# Patient Record
Sex: MALE | Race: WHITE | URBAN - METROPOLITAN AREA
[De-identification: names, ages, dates, MRNs, and addresses within clinical notes are randomized per-mention and may not be internally consistent; named-entity substitution may affect disease eponyms.]

---

## 2020-09-10 DIAGNOSIS — Z11.59 ENCOUNTER FOR SCREENING FOR OTHER VIRAL DISEASES: Primary | ICD-10-CM

## 2020-11-19 DIAGNOSIS — Z11.59 ENCOUNTER FOR SCREENING FOR OTHER VIRAL DISEASES: ICD-10-CM

## 2020-11-19 PROCEDURE — U0003 INFECTIOUS AGENT DETECTION BY NUCLEIC ACID (DNA OR RNA); SEVERE ACUTE RESPIRATORY SYNDROME CORONAVIRUS 2 (SARS-COV-2) (CORONAVIRUS DISEASE [COVID-19]), AMPLIFIED PROBE TECHNIQUE, MAKING USE OF HIGH THROUGHPUT TECHNOLOGIES AS DESCRIBED BY CMS-2020-01-R: HCPCS | Performed by: COLON & RECTAL SURGERY

## 2020-11-20 LAB
SARS-COV-2 RNA SPEC QL NAA+PROBE: NOT DETECTED
SPECIMEN SOURCE: NORMAL

## 2020-11-23 ENCOUNTER — HOSPITAL ENCOUNTER (OUTPATIENT)
Facility: CLINIC | Age: 60
Discharge: HOME | End: 2020-11-23
Attending: COLON & RECTAL SURGERY | Admitting: COLON & RECTAL SURGERY
Payer: COMMERCIAL

## 2020-11-23 VITALS
SYSTOLIC BLOOD PRESSURE: 118 MMHG | BODY MASS INDEX: 25.74 KG/M2 | OXYGEN SATURATION: 95 % | HEIGHT: 67 IN | RESPIRATION RATE: 17 BRPM | HEART RATE: 72 BPM | WEIGHT: 164 LBS | DIASTOLIC BLOOD PRESSURE: 87 MMHG | TEMPERATURE: 98.8 F

## 2020-11-23 LAB — COLONOSCOPY: NORMAL

## 2020-11-23 PROCEDURE — 250N000011 HC RX IP 250 OP 636: Performed by: COLON & RECTAL SURGERY

## 2020-11-23 PROCEDURE — 45378 DIAGNOSTIC COLONOSCOPY: CPT | Performed by: COLON & RECTAL SURGERY

## 2020-11-23 PROCEDURE — G0121 COLON CA SCRN NOT HI RSK IND: HCPCS | Performed by: COLON & RECTAL SURGERY

## 2020-11-23 PROCEDURE — G0500 MOD SEDAT ENDO SERVICE >5YRS: HCPCS | Performed by: COLON & RECTAL SURGERY

## 2020-11-23 RX ORDER — DIPHENHYDRAMINE HCL 25 MG
25 CAPSULE ORAL EVERY 4 HOURS PRN
Status: CANCELLED | OUTPATIENT
Start: 2020-11-23

## 2020-11-23 RX ORDER — ONDANSETRON 2 MG/ML
4 INJECTION INTRAMUSCULAR; INTRAVENOUS EVERY 6 HOURS PRN
Status: CANCELLED | OUTPATIENT
Start: 2020-11-23

## 2020-11-23 RX ORDER — DIPHENHYDRAMINE HYDROCHLORIDE 50 MG/ML
25 INJECTION INTRAMUSCULAR; INTRAVENOUS EVERY 4 HOURS PRN
Status: CANCELLED | OUTPATIENT
Start: 2020-11-23

## 2020-11-23 RX ORDER — NALOXONE HYDROCHLORIDE 0.4 MG/ML
0.2 INJECTION, SOLUTION INTRAMUSCULAR; INTRAVENOUS; SUBCUTANEOUS
Status: CANCELLED | OUTPATIENT
Start: 2020-11-23

## 2020-11-23 RX ORDER — ONDANSETRON 4 MG/1
4 TABLET, ORALLY DISINTEGRATING ORAL EVERY 6 HOURS PRN
Status: CANCELLED | OUTPATIENT
Start: 2020-11-23

## 2020-11-23 RX ORDER — NALOXONE HYDROCHLORIDE 0.4 MG/ML
0.4 INJECTION, SOLUTION INTRAMUSCULAR; INTRAVENOUS; SUBCUTANEOUS
Status: CANCELLED | OUTPATIENT
Start: 2020-11-23

## 2020-11-23 RX ORDER — FLUMAZENIL 0.1 MG/ML
0.2 INJECTION, SOLUTION INTRAVENOUS
Status: CANCELLED | OUTPATIENT
Start: 2020-11-23 | End: 2020-11-23

## 2020-11-23 RX ORDER — FENTANYL CITRATE 50 UG/ML
INJECTION, SOLUTION INTRAMUSCULAR; INTRAVENOUS PRN
Status: DISCONTINUED | OUTPATIENT
Start: 2020-11-23 | End: 2020-11-23 | Stop reason: HOSPADM

## 2020-11-23 RX ORDER — PROCHLORPERAZINE MALEATE 10 MG
10 TABLET ORAL EVERY 6 HOURS PRN
Status: CANCELLED | OUTPATIENT
Start: 2020-11-23

## 2020-11-23 ASSESSMENT — MIFFLIN-ST. JEOR: SCORE: 1512.53

## 2020-11-23 NOTE — H&P
"Pre-Endoscopy History and Physical     Amador Buckner MRN# [id number]   YOB: 1960 Age: 60 year old     Date of Procedure: 11/23/2020  Primary care provider: No Ref-Primary, Physician  Type of Endoscopy: colonoscopy  Reason for Procedure: screening  Type of Anesthesia Anticipated: Moderate Sedation    HPI:    Amador is a 60 year old male who will be undergoing the above procedure.      A history and physical has been performed. The patient's medications and allergies have been reviewed. The risks and benefits of the procedure including the risk of bleeding, perforation, and missed lesions as well as the sedation options and risks were discussed with the patient.  All questions were answered and informed consent was obtained.      No Known Allergies     No current facility-administered medications for this encounter.        Medications Prior to Admission   Medication Sig Dispense Refill Last Dose     B Complex-C (VITAMIN B COMPLEX W/VITAMIN C) TABS tablet Take 1 tablet by mouth daily   Past Week at Unknown time     Emtricitabine-Tenofovir AF (DESCOVY PO)    Past Week at Unknown time     Escitalopram Oxalate (LEXAPRO PO)    Past Week at Unknown time     FINASTERIDE PO    Past Week at Unknown time     Omega-3 Fatty Acids (FISH OIL PO)           There is no problem list on file for this patient.       Past Medical History:   Diagnosis Date     Anxiety         Past Surgical History:   Procedure Laterality Date     COLONOSCOPY       DISCECTOMY LUMBAR POSTERIOR MICROSCOPIC TWO LEVELS      15 and 8 yrs ago       Social History     Tobacco Use     Smoking status: Never Smoker     Smokeless tobacco: Never Used   Substance Use Topics     Alcohol use: Yes     Comment: beers 5-7 a week       History reviewed. No pertinent family history.      PHYSICAL EXAM:   BP (!) 129/95   Pulse 81   Temp 98.8  F (37.1  C) (Oral)   Resp 16   Ht 1.702 m (5' 7\")   Wt 74.4 kg (164 lb)   SpO2 99%   BMI 25.69 kg/m   Estimated body " "mass index is 25.69 kg/m  as calculated from the following:    Height as of this encounter: 1.702 m (5' 7\").    Weight as of this encounter: 74.4 kg (164 lb).   Mental status - alert and oriented  RESP: lungs clear  CV: RRR  AIRWAY EXAM: Mallampatti Class II (visualization of the soft palate, fauces, and uvula)    IMPRESSION   ASA Class 1 - Healthy patient, no medical problems      Signed Electronically by: Demian Reina MD  November 23, 2020    Colorectal Surgery  [phone] (office)  [phone] (pager)  [email or web address]          "

## 2022-04-20 ENCOUNTER — TRANSCRIBE ORDERS (OUTPATIENT)
Dept: OTHER | Age: 62
End: 2022-04-20
Payer: COMMERCIAL

## 2022-04-20 DIAGNOSIS — M79.601 RIGHT ARM PAIN: Primary | ICD-10-CM

## 2022-04-20 DIAGNOSIS — M75.41 IMPINGEMENT SYNDROME OF SHOULDER REGION, RIGHT: ICD-10-CM

## 2022-04-20 DIAGNOSIS — M75.21 BICEPS TENDINITIS OF RIGHT UPPER EXTREMITY: ICD-10-CM

## 2022-04-26 ENCOUNTER — THERAPY VISIT (OUTPATIENT)
Dept: PHYSICAL THERAPY | Facility: CLINIC | Age: 62
End: 2022-04-26
Payer: COMMERCIAL

## 2022-04-26 DIAGNOSIS — M75.41 IMPINGEMENT SYNDROME, SHOULDER, RIGHT: ICD-10-CM

## 2022-04-26 DIAGNOSIS — M79.601 PAIN OF RIGHT UPPER EXTREMITY: ICD-10-CM

## 2022-04-26 DIAGNOSIS — M75.21 BICEPS TENDONITIS, RIGHT: ICD-10-CM

## 2022-04-26 DIAGNOSIS — M75.21 BICEPS TENDINITIS OF RIGHT UPPER EXTREMITY: ICD-10-CM

## 2022-04-26 DIAGNOSIS — M79.601 RIGHT ARM PAIN: ICD-10-CM

## 2022-04-26 DIAGNOSIS — M75.41 IMPINGEMENT SYNDROME OF SHOULDER REGION, RIGHT: ICD-10-CM

## 2022-04-26 PROCEDURE — 97112 NEUROMUSCULAR REEDUCATION: CPT | Mod: GP | Performed by: PHYSICAL THERAPIST

## 2022-04-26 PROCEDURE — 97110 THERAPEUTIC EXERCISES: CPT | Mod: GP | Performed by: PHYSICAL THERAPIST

## 2022-04-26 PROCEDURE — 97161 PT EVAL LOW COMPLEX 20 MIN: CPT | Mod: GP | Performed by: PHYSICAL THERAPIST

## 2022-04-26 NOTE — PROGRESS NOTES
"Physical Therapy Initial Evaluation  Subjective:    Patient Health History  Amador Buckner being seen for R shoulder/arm pain.     Problem began: 4/20/2022 (MD visit).   Problem occurred: 6 weeks ago picked up a bag of groceries with extended arm and started the pain.  Has improved over the past few days.     Pain is reported as 7/10 on pain scale.  General health as reported by patient is good.  Pertinent medical history includes: sleep disorder/apnea and numbness/tingling (history of cervical fusion with previous L hand issued before fusion.  R hand dominant., neuropathy in B hands and B feet).   Red flags:  Pain at rest/night (pain at night).  Medical allergies: other. Other medical allergies details: chlorine.   Surgeries include:  Orthopedic surgery and other. Other surgery history details: Lumbar fusion 2005, Lumbar diskectomy 2013, cataracts, anterior cervical fusion C3-C6  2/16/21.    Current medications:  Anti-inflammatory (Aleve).    Current occupation is Just retired- [employer] .   Primary job tasks include:  Driving, lifting/carrying and pushing/pulling.                  Therapist Generated HPI Evaluation         Type of problem:  Right shoulder.    This is a new condition.      Patient reports pain:  Upper arm, lateral and posterior.  Pain is described as shooting and sharp and is constant (1-7/10).  Pain radiates to:  Upper arm and lower arm. Pain is worse during the night.  Since onset symptoms are gradually improving.  Associated symptoms:  Numbness, tingling and loss of strength (Numb entire R arm to hand, non painful cracking). Symptoms are exacerbated by lifting, using arm at shoulder level and using arm overhead (during and right after using elliptical numbness in R arm and down to all fingers R hand, lifting 1/2 gallon of milk,  reaching, pain awakes at night, getting up in morning, put coat on)  and relieved by NSAID's (Aleve).  Special tests included:  X-ray (\"no " "tears\").    Barriers include:  None as reported by patient (friend living with patient now and can help with yard work).                        Objective:  System              Cervical/Thoracic Evaluation  Arom wnl cervical: cervical protrusion min loss neck and R shoulder pain, cervical retraction mod loss NE/NE to pain.     AROM:  AROM Cervical:    Flexion:            Min loss  Extension:       75% loss  Rotation:         Left: 60% loss pain incerases in R upper arm     Right: 50% loss  Side Bend:      Left: max loss R neck pain     Right:  Max loss R neck pain      Headaches: none        Cervical Dermatomes:  normal                                   Shoulder Evaluation:  ROM:  AROM:    Flexion:  Left:  130    Right:  130 + tension    Abduction:  Left: 150 slight scaption   Right:  150 slight scaption (only to 115 straight ABD)      External Rotation:  Left:  50    Right:  50 +tension            Extension/Internal Rotation:  Left:  T11    Right:  L1 + pain    PROM:  : PROM difficult due to patient guarding.          Internal Rotation:  Right:  40  External Rotation:  Right:  70                    Strength:    Flexion: Left:4+/5   Pain:    Right: 4+/5     Pain:     Abduction:  Left: 5-/5  Pain:    Right: 5-/5     Pain:    Internal Rotation:  Left:5/5     Pain:    Right: 4+/5     Pain:  External Rotation:   Left:4+/5     Pain:   Right:4-/5     Pain:        Elbow Flexion:  Left:4+/5     Pain:    Right:4-/5     Pain:  Elbow Extension:  Left:4+/5     Pain:    Right:4/5     Pain:    Special Tests:  Special tests assessed shoulder: (+) R speeds.    Left shoulder negative for the following special tests:  Impingement  Right shoulder positive for the following special tests:Impingement (+) Caryl martinez horizontal ADD [place] Cervical Evaluation    Posture:  Sitting: poor  Standing: fair  Protruding Head: yes  Wry Neck: no  Correction of Posture: no effect      Test " Movements:      RET: During: no effect  After: no effect    Repeat RET: During: no effect  After: no effect                            Conclusion: other (shoulder impingement, some (+) neck tests)                                           ROS    Assessment/Plan:    Patient is a 61 year old male with right side shoulder complaints.    Patient has the following significant findings with corresponding treatment plan.                Diagnosis 1:  R arm pain, impingement, biceps tendinitis    Pain -  hot/cold therapy, manual therapy, self management, education and home program  Decreased ROM/flexibility - manual therapy, therapeutic exercise, therapeutic activity and home program  Decreased strength - therapeutic exercise, therapeutic activities and home program  Decreased function - therapeutic activities and home program  Impaired posture - neuro re-education, therapeutic activities and home program    Therapy Evaluation Codes:   Cumulative Therapy Evaluation is: Low complexity.    Previous and current functional limitations:  (See Goal Flow Sheet for this information)    Short term and Long term goals: (See Goal Flow Sheet for this information)     Communication ability:  Patient appears to be able to clearly communicate and understand verbal and written communication and follow directions correctly.  Treatment Explanation - The following has been discussed with the patient:   RX ordered/plan of care  Anticipated outcomes  Possible risks and side effects  This patient would benefit from PT intervention to resume normal activities.   Rehab potential is good.    Frequency:  1 X week, once daily  Duration:  for 8 weeks  Discharge Plan:  Achieve all LTG.  Independent in home treatment program.  Reach maximal therapeutic benefit.    Please refer to the daily flowsheet for treatment today, total treatment time and time spent performing 1:1 timed codes.

## 2022-04-26 NOTE — LETTER
[facility]  [address]  [address]  [place] [address]  [phone]    2022    Re: Amador Buckner   :   1960  MRN:  [id number]   REFERRING PHYSICIAN:   Lizbeth SHANNON [facility]  Date of Initial Evaluation: 22  Visits:  Rxs Used: 1  Reason for Referral:     Right arm pain  Impingement syndrome of shoulder region, right  Biceps tendinitis of right upper extremity  Pain of right upper extremity  Biceps tendonitis, right  Impingement syndrome, shoulder, right    EVALUATION SUMMARY  Physical Therapy Initial Evaluation  Subjective:    Patient Health History  Amador Buckner being seen for R shoulder/arm pain.   Problem began: 2022 (MD visit).   Problem occurred: 6 weeks ago picked up a bag of groceries with extended arm and started the pain.  Has improved over the past few days.     Pain is reported as 7/10 on pain scale.  General health as reported by patient is good.  Pertinent medical history includes: sleep disorder/apnea and numbness/tingling (history of cervical fusion with previous L hand issued before fusion.  R hand dominant., neuropathy in B hands and B feet).   Red flags:  Pain at rest/night (pain at night).  Medical allergies: other. Other medical allergies details: chlorine.   Surgeries include:  Orthopedic surgery and other. Other surgery history details: Lumbar fusion , Lumbar diskectomy , cataracts, anterior cervical fusion C3-C6  21.    Current medications:  Anti-inflammatory (Aleve).    Current occupation is Just retired- [employer] .   Primary job tasks include:  Driving, lifting/carrying and pushing/pulling.                Therapist Generated HPI Evaluation  Type of problem:  Right shoulder.  This is a new condition.  Patient reports pain:  Upper arm, lateral and posterior.  Pain is described as shooting and sharp and is  "constant (1-7/10).  Pain radiates to:  Upper arm and lower arm. Pain is worse during the night.  Since onset symptoms are gradually improving.    Re: Amador Buckner   :   1960    Associated symptoms:  Numbness, tingling and loss of strength (Numb entire R arm to hand, non painful cracking). Symptoms are exacerbated by lifting, using arm at shoulder level and using arm overhead (during and right after using elliptical numbness in R arm and down to all fingers R hand, lifting 1/2 gallon of milk,  reaching, pain awakes at night, getting up in morning, put coat on)  and relieved by NSAID's (Aleve).  Special tests included:  X-ray (\"no tears\").  Barriers include:  None as reported by patient (friend living with patient now and can help with yard work).    Objective:  System  Cervical/Thoracic Evaluation  Arom wnl cervical: cervical protrusion min loss neck and R shoulder pain, cervical retraction mod loss NE/NE to pain.     AROM:  AROM Cervical:  Flexion:            Min loss  Extension:       75% loss  Rotation:         Left: 60% loss pain incerases in R upper arm     Right: 50% loss  Side Bend:      Left: max loss R neck pain     Right:  Max loss R neck pain  Headaches: none  Cervical Dermatomes:  normal       Shoulder Evaluation:  ROM:  AROM:    Flexion:  Left:  130    Right:  130 + tension  Abduction:  Left: 150 slight scaption   Right:  150 slight scaption (only to 115 straight ABD)  External Rotation:  Left:  50    Right:  50 +tension  Extension/Internal Rotation:  Left:  T11    Right:  L1 + pain    PROM:  : PROM difficult due to patient guarding.  Internal Rotation:  Right:  40  External Rotation:  Right:  70  Strength:    Flexion: Left:4+/5   Pain:    Right: 4+/5      Abduction:  Left: 5-/5  Pain:    Right: 5-/5       Internal Rotation:  Left:5/5     Pain:    Right: 4+/5       External Rotation:   Left:4+/5     Pain:   Right:4-/5      Elbow Flexion:  Left:4+/5     Pain:    Right:4-/5       Elbow Extension:  " Left:4+/5     Pain:    Right:4/5       Special Tests:  Special tests assessed shoulder: (+) R speeds.  Left shoulder negative for the following special tests:  Impingement  Right shoulder positive for the following special tests:Impingement (+) Caryl martinez, sebastián ADD                          Re: Amador Buckner   :   1960  [place] Cervical Evaluation  Posture:  Sitting: poor  Standing: fair  Protruding Head: yes  Wry Neck: no  Correction of Posture: no effect  Test Movements:  RET: During: no effect  After: no effect    Repeat RET: During: no effect  After: no effect    Conclusion: other (shoulder impingement, some (+) neck tests)      Assessment/Plan:    Patient is a 61 year old male with right side shoulder complaints.    Patient has the following significant findings with corresponding treatment plan.                Diagnosis 1:  R arm pain, impingement, biceps tendinitis  Pain -  hot/cold therapy, manual therapy, self management, education and home program  Decreased ROM/flexibility - manual therapy, therapeutic exercise, therapeutic activity and home program  Decreased strength - therapeutic exercise, therapeutic activities and home program  Decreased function - therapeutic activities and home program  Impaired posture - neuro re-education, therapeutic activities and home program    Therapy Evaluation Codes:   Cumulative Therapy Evaluation is: Low complexity.    Previous and current functional limitations:  (See Goal Flow Sheet for this information)    Short term and Long term goals: (See Goal Flow Sheet for this information)     Communication ability:  Patient appears to be able to clearly communicate and understand verbal and written communication and follow directions correctly.  Treatment Explanation - The following has been discussed with the patient:     RX ordered/plan of care  Anticipated outcomes  Possible risks and side effects  This patient would benefit from PT intervention to resume  normal activities.   Rehab potential is good.    Frequency:  1 X week, once daily  Duration:  for 8 weeks  Discharge Plan:  Achieve all LTG.  Independent in home treatment program.  Reach maximal therapeutic benefit.      Thank you for your referral.            Re: Amador Buckner   :   1960    INQUIRIES  Therapist: KACEY Mendez [address] [address]  Phone: [phone]  Fax: [phone]

## 2022-05-06 ENCOUNTER — THERAPY VISIT (OUTPATIENT)
Dept: PHYSICAL THERAPY | Facility: CLINIC | Age: 62
End: 2022-05-06
Payer: COMMERCIAL

## 2022-05-06 DIAGNOSIS — M75.41 IMPINGEMENT SYNDROME, SHOULDER, RIGHT: ICD-10-CM

## 2022-05-06 DIAGNOSIS — M79.601 PAIN OF RIGHT UPPER EXTREMITY: Primary | ICD-10-CM

## 2022-05-06 DIAGNOSIS — M75.21 BICEPS TENDONITIS, RIGHT: ICD-10-CM

## 2022-05-06 PROCEDURE — 97140 MANUAL THERAPY 1/> REGIONS: CPT | Mod: GP | Performed by: PHYSICAL THERAPIST

## 2022-05-06 PROCEDURE — 97112 NEUROMUSCULAR REEDUCATION: CPT | Mod: GP | Performed by: PHYSICAL THERAPIST

## 2022-05-06 PROCEDURE — 97110 THERAPEUTIC EXERCISES: CPT | Mod: GP | Performed by: PHYSICAL THERAPIST

## 2022-05-13 ENCOUNTER — THERAPY VISIT (OUTPATIENT)
Dept: PHYSICAL THERAPY | Facility: CLINIC | Age: 62
End: 2022-05-13
Payer: COMMERCIAL

## 2022-05-13 DIAGNOSIS — M75.21 BICEPS TENDONITIS, RIGHT: ICD-10-CM

## 2022-05-13 DIAGNOSIS — M75.41 IMPINGEMENT SYNDROME, SHOULDER, RIGHT: ICD-10-CM

## 2022-05-13 DIAGNOSIS — M79.601 PAIN OF RIGHT UPPER EXTREMITY: Primary | ICD-10-CM

## 2022-05-13 PROCEDURE — 97110 THERAPEUTIC EXERCISES: CPT | Mod: GP | Performed by: PHYSICAL THERAPIST

## 2022-05-13 PROCEDURE — 97140 MANUAL THERAPY 1/> REGIONS: CPT | Mod: GP | Performed by: PHYSICAL THERAPIST

## 2022-05-13 PROCEDURE — 97112 NEUROMUSCULAR REEDUCATION: CPT | Mod: GP | Performed by: PHYSICAL THERAPIST

## 2022-09-24 ENCOUNTER — HEALTH MAINTENANCE LETTER (OUTPATIENT)
Age: 62
End: 2022-09-24

## 2023-04-26 PROBLEM — M75.21 BICEPS TENDONITIS, RIGHT: Status: RESOLVED | Noted: 2022-04-26 | Resolved: 2023-04-26

## 2023-04-26 PROBLEM — M75.41 IMPINGEMENT SYNDROME, SHOULDER, RIGHT: Status: RESOLVED | Noted: 2022-04-26 | Resolved: 2023-04-26

## 2023-04-26 PROBLEM — M79.601 PAIN OF RIGHT UPPER EXTREMITY: Status: RESOLVED | Noted: 2022-04-26 | Resolved: 2023-04-26

## 2023-04-26 NOTE — PROGRESS NOTES
Discharge Note    Progress reporting period is from last daily teatment note on   to May 13, 2022. For further information see SOAP dated 5/7/2023.  Amador failed to follow up and current status is unknown.  Please see information below for last relevant information on current status.  Patient seen for 3 visits.    SUBJECTIVE  Subjective changes noted by patient:  Pt arrives today reporting shoulder feels good. Continues to have neck and arm pain present at night.  .  Current pain level is 0/10.     Previous pain level was  7/10.   Changes in function:  Yes (See Goal flowsheet attached for changes in current functional level)  Adverse reaction to treatment or activity: None    OBJECTIVE  Changes noted in objective findings: AROM R shoulder flex: 140, abduction 129, ER 55.     ASSESSMENT/PLAN  Diagnosis: R arm pain, impingement, biceps tendinitis   Updated problem list and treatment plan:   Decreased ROM/flexibility - HEP  Decreased function - HEP  STG/LTGs have been met or progress has been made towards goals:  Yes, please see goal flowsheet for most current information  Assessment of Progress: current status is unknown.    Last current status:     Self Management Plans:  HEP  I have re-evaluated this patient and find that the nature, scope, duration and intensity of the therapy is appropriate for the medical condition of the patient.  Amador continues to require the following intervention to meet STG and LTG's:  HEP.    Recommendations:  Discharge with current home program.  Patient to follow up with MD as needed.    Please refer to the daily flowsheet for treatment today, total treatment time and time spent performing 1:1 timed codes.

## 2023-10-14 ENCOUNTER — HEALTH MAINTENANCE LETTER (OUTPATIENT)
Age: 63
End: 2023-10-14

## 2024-12-01 ENCOUNTER — HEALTH MAINTENANCE LETTER (OUTPATIENT)
Age: 64
End: 2024-12-01

## (undated) RX ORDER — FENTANYL CITRATE 50 UG/ML
INJECTION, SOLUTION INTRAMUSCULAR; INTRAVENOUS
Status: DISPENSED
Start: 2020-11-23